# Patient Record
Sex: FEMALE | Race: WHITE | NOT HISPANIC OR LATINO | Employment: OTHER | URBAN - METROPOLITAN AREA
[De-identification: names, ages, dates, MRNs, and addresses within clinical notes are randomized per-mention and may not be internally consistent; named-entity substitution may affect disease eponyms.]

---

## 2023-11-20 ENCOUNTER — HOSPITAL ENCOUNTER (EMERGENCY)
Facility: CLINIC | Age: 68
Discharge: HOME | End: 2023-11-20
Attending: EMERGENCY MEDICINE | Admitting: EMERGENCY MEDICINE
Payer: MEDICARE

## 2023-11-20 VITALS
SYSTOLIC BLOOD PRESSURE: 169 MMHG | DIASTOLIC BLOOD PRESSURE: 119 MMHG | HEART RATE: 97 BPM | RESPIRATION RATE: 18 BRPM | HEIGHT: 66 IN | WEIGHT: 180 LBS | OXYGEN SATURATION: 98 % | TEMPERATURE: 98.1 F | BODY MASS INDEX: 28.93 KG/M2

## 2023-11-20 DIAGNOSIS — I83.899 BLEEDING FROM VARICOSE VEIN: ICD-10-CM

## 2023-11-20 PROCEDURE — 99282 EMERGENCY DEPT VISIT SF MDM: CPT

## 2023-11-20 PROCEDURE — 99283 EMERGENCY DEPT VISIT LOW MDM: CPT | Performed by: EMERGENCY MEDICINE

## 2023-11-20 NOTE — ED TRIAGE NOTES
Pt presents with bleeding to vessel of right inner ankle. Spurting blood after pt picked a scab. Could not get bleeding to stop for 20 minutes. EMS arrived to pts house and helped her wrap her ankle. Bleeding then controlled and no bleeding upon arrival. Light headed after incident. Feeling better.      Triage Assessment (Adult)       Row Name 11/20/23 1328          Triage Assessment    Airway WDL WDL        Respiratory WDL    Respiratory WDL WDL        Skin Circulation/Temperature WDL    Skin Circulation/Temperature WDL WDL        Cardiac WDL    Cardiac WDL WDL        Peripheral/Neurovascular WDL    Peripheral Neurovascular WDL WDL        Cognitive/Neuro/Behavioral WDL    Cognitive/Neuro/Behavioral WDL WDL

## 2023-11-20 NOTE — ED PROVIDER NOTES
"  History     Chief Complaint   Patient presents with    Circulatory Problem     Bleeding to right inner ankle vessel     HPI  Zaida Maloney is a 68 year old female who presents for bleeding from her right lower extremity.  Started shortly prior to arrival.  She thought there was a scab and she itched at this, unfortunately it started to bleed immediately, she reports that blood was drained from the wound.  She has varicose veins in the lower extremity and it was bleeding from the area of this.  She felt lightheaded at the time but is feeling better now.  She was able to get help from her  who called EMS.  They applied pressure and got control the bleeding and she came here by private vehicle.  No further complaints currently.  No fevers, chest pain, difficulty breathing.    Allergies:  Allergies   Allergen Reactions    Lyrica [Pregabalin] Shortness Of Breath    Macrodantin [Nitrofurantoin] Hives    Nsaids Hives       Problem List:    There are no problems to display for this patient.       Past Medical History:    No past medical history on file.    Past Surgical History:    No past surgical history on file.    Family History:    No family history on file.    Social History:  Marital Status:   [2]        Medications:    No current outpatient medications on file.        Review of Systems    Physical Exam   BP: (!) 169/119  Pulse: 97  Temp: 98.1  F (36.7  C)  Resp: 18  Height: 167.6 cm (5' 6\")  Weight: 81.6 kg (180 lb)  SpO2: 98 %      Physical Exam  Constitutional:       General: She is not in acute distress.     Appearance: Normal appearance. She is well-developed.   HENT:      Head: Normocephalic and atraumatic.   Eyes:      General: No scleral icterus.     Conjunctiva/sclera: Conjunctivae normal.   Cardiovascular:      Rate and Rhythm: Normal rate.   Pulmonary:      Effort: Pulmonary effort is normal. No respiratory distress.   Abdominal:      General: Abdomen is flat. "   Musculoskeletal:      Cervical back: Normal range of motion and neck supple.   Skin:     General: Skin is warm and dry.      Comments: Scab over the medial right ankle, bleeding controlled.  Varicose veins present.   Neurological:      Mental Status: She is alert and oriented to person, place, and time.         ED Course                 Procedures              Critical Care time:  none               No results found for this or any previous visit (from the past 24 hour(s)).    Medications - No data to display    Assessments & Plan (with Medical Decision Making)   68-year-old female presents after evaluation after bleeding from a varicose vein.  Bleeding is controlled.  She is safe to discharge with instructions to return if she has worsening of her symptoms or other concerns, otherwise follow-up in clinic.    I have reviewed the nursing notes.    I have reviewed the findings, diagnosis, plan and need for follow up with the patient.           New Prescriptions    No medications on file       Final diagnoses:   Bleeding from varicose vein       11/20/2023   [facility] EMERGENCY DEPT       Shashi Pope MD  11/20/23 3787

## 2023-11-20 NOTE — DISCHARGE INSTRUCTIONS
Compression stockings can help with the varicose veins and prevent this from happening.  Wear socks at night to protect your skin.  Cover the area with a bandage to help prevent scratching it again.  Follow-up in clinic.  If you have repeat bleeding put pressure over the spot of bleeding with 1 finger and hold this in place without checking or lifting pressure for at least 15 minutes.  If the you are still bleeding after this come back for a recheck